# Patient Record
Sex: FEMALE | Race: WHITE | NOT HISPANIC OR LATINO
[De-identification: names, ages, dates, MRNs, and addresses within clinical notes are randomized per-mention and may not be internally consistent; named-entity substitution may affect disease eponyms.]

---

## 2018-03-11 ENCOUNTER — TRANSCRIPTION ENCOUNTER (OUTPATIENT)
Age: 33
End: 2018-03-11

## 2018-03-19 ENCOUNTER — APPOINTMENT (OUTPATIENT)
Dept: ORTHOPEDIC SURGERY | Facility: CLINIC | Age: 33
End: 2018-03-19
Payer: COMMERCIAL

## 2018-03-19 VITALS
DIASTOLIC BLOOD PRESSURE: 79 MMHG | WEIGHT: 135 LBS | BODY MASS INDEX: 23.92 KG/M2 | SYSTOLIC BLOOD PRESSURE: 132 MMHG | HEIGHT: 63 IN | HEART RATE: 74 BPM

## 2018-03-19 DIAGNOSIS — Z84.89 FAMILY HISTORY OF OTHER SPECIFIED CONDITIONS: ICD-10-CM

## 2018-03-19 DIAGNOSIS — Z78.9 OTHER SPECIFIED HEALTH STATUS: ICD-10-CM

## 2018-03-19 PROCEDURE — 99204 OFFICE O/P NEW MOD 45 MIN: CPT | Mod: 25

## 2018-03-19 PROCEDURE — 20551 NJX 1 TENDON ORIGIN/INSJ: CPT | Mod: LT

## 2018-03-19 PROCEDURE — 73080 X-RAY EXAM OF ELBOW: CPT | Mod: LT

## 2018-03-19 RX ORDER — DICLOFENAC SODIUM 16.05 MG/ML
1.5 SOLUTION TOPICAL
Qty: 1 | Refills: 0 | Status: ACTIVE | COMMUNITY
Start: 2018-03-19 | End: 1900-01-01

## 2018-07-20 ENCOUNTER — APPOINTMENT (OUTPATIENT)
Dept: ORTHOPEDIC SURGERY | Facility: CLINIC | Age: 33
End: 2018-07-20
Payer: COMMERCIAL

## 2018-07-20 PROCEDURE — 99214 OFFICE O/P EST MOD 30 MIN: CPT

## 2018-07-20 RX ORDER — DICLOFENAC SODIUM 20 MG/G
2 SOLUTION TOPICAL
Qty: 1 | Refills: 0 | Status: ACTIVE | COMMUNITY
Start: 2018-07-20 | End: 1900-01-01

## 2019-06-10 ENCOUNTER — APPOINTMENT (OUTPATIENT)
Dept: ORTHOPEDIC SURGERY | Facility: CLINIC | Age: 34
End: 2019-06-10
Payer: COMMERCIAL

## 2019-06-10 PROCEDURE — 99214 OFFICE O/P EST MOD 30 MIN: CPT

## 2019-06-10 NOTE — HISTORY OF PRESENT ILLNESS
[FreeTextEntry1] : he patient is a 33-year-old female who presents for evaluation of left elbow pain.Her symptoms began approximately 2 years ago when she was active in weightlifting since that time she has gotten progressively worse and now presents with pain and stiffness of the elbow. The pain is still in nature located circumferentially around the elbow without radiation. It is worse with activity and improved with rest, she denies paresthesias.

## 2019-06-10 NOTE — ASSESSMENT
[FreeTextEntry1] : the patient is a 33-year-old female with 2 years of progressive left elbow pain now presented with pain and stiffness. MRI is positive for an effusion, I recommend ultrasound-guided aspiration and lab analysis. She will followup to review the results of the aspiration/analysis. She will continue activity as tolerated.

## 2019-06-10 NOTE — PHYSICAL EXAM
[Normal LUE] : Left Upper Extremity: No scars, rashes, lesions, ulcers, skin intact [Normal Finger/nose] : finger to nose coordination [Normal] : no peripheral adenopathy appreciated [de-identified] : left elbow:\par Skin intact no swelling no erythema no ecchymosis\par Mild tenderness to palpation at the radiocapitellar joint, no tenderness at the medial or lateral epicondyles or olecranon\par Range of motion limited secondary to stiffness ° flexion extension, pronation supination intact\par range of motion of the shoulder wrist and digits intact without pain\par Sensation intact distally, capillary refill less than 2 seconds [de-identified] : lillyr [de-identified] : MRI of the left elbow is reviewed there is a significant joint effusion containing "fibrinous" material no fracture or significant soft tissue injury

## 2019-06-10 NOTE — CONSULT LETTER
[Consult Letter:] : I had the pleasure of evaluating your patient, [unfilled]. [Dear  ___] : Dear  [unfilled], [Please see my note below.] : Please see my note below. [Sincerely,] : Sincerely, [Consult Closing:] : Thank you very much for allowing me to participate in the care of this patient.  If you have any questions, please do not hesitate to contact me. [FreeTextEntry3] : Dr. Eryn Brewer\par Orthopaedic Surgery\par Hand & Upper Extremity.\par

## 2019-06-24 ENCOUNTER — OUTPATIENT (OUTPATIENT)
Dept: OUTPATIENT SERVICES | Facility: HOSPITAL | Age: 34
LOS: 1 days | End: 2019-06-24

## 2019-06-24 ENCOUNTER — APPOINTMENT (OUTPATIENT)
Dept: ULTRASOUND IMAGING | Facility: CLINIC | Age: 34
End: 2019-06-24
Payer: COMMERCIAL

## 2019-06-24 PROCEDURE — 20606 DRAIN/INJ JOINT/BURSA W/US: CPT | Mod: LT

## 2019-06-25 ENCOUNTER — TRANSCRIPTION ENCOUNTER (OUTPATIENT)
Age: 34
End: 2019-06-25

## 2019-06-25 ENCOUNTER — OUTPATIENT (OUTPATIENT)
Dept: OUTPATIENT SERVICES | Facility: HOSPITAL | Age: 34
LOS: 1 days | End: 2019-06-25
Payer: COMMERCIAL

## 2019-06-25 DIAGNOSIS — M25.422 EFFUSION, LEFT ELBOW: ICD-10-CM

## 2019-06-25 PROCEDURE — 87075 CULTR BACTERIA EXCEPT BLOOD: CPT

## 2019-06-25 PROCEDURE — 87070 CULTURE OTHR SPECIMN AEROBIC: CPT

## 2019-06-25 PROCEDURE — 87205 SMEAR GRAM STAIN: CPT

## 2019-07-01 ENCOUNTER — APPOINTMENT (OUTPATIENT)
Dept: ORTHOPEDIC SURGERY | Facility: CLINIC | Age: 34
End: 2019-07-01
Payer: COMMERCIAL

## 2019-07-01 VITALS
HEIGHT: 63 IN | SYSTOLIC BLOOD PRESSURE: 122 MMHG | HEART RATE: 77 BPM | DIASTOLIC BLOOD PRESSURE: 69 MMHG | BODY MASS INDEX: 23.92 KG/M2 | WEIGHT: 135 LBS

## 2019-07-01 PROCEDURE — 99213 OFFICE O/P EST LOW 20 MIN: CPT

## 2019-07-02 NOTE — REVIEW OF SYSTEMS
[Joint Pain] : joint pain [Joint Stiffness] : joint stiffness [Negative] : Allergic/Immunologic [FreeTextEntry9] : Left elbow pain and stiffness

## 2019-07-02 NOTE — ASSESSMENT
[FreeTextEntry1] : Patient with stiffness to the left elbow along with an effusion that was diagnosed on MRI. She had an aspiration of the left elbow effusion on 06/24 with slight improvement in her symptoms. At this time the patient is still having difficulty fully extending her elbow. I would recommend a rheumatology consult for the patient to further understand the etiology of her effusion. The patient will follow back up in the office p.r.n.

## 2019-07-02 NOTE — PHYSICAL EXAM
[Normal LUE] : Left Upper Extremity: No scars, rashes, lesions, ulcers, skin intact [Normal Finger/nose] : finger to nose coordination [Normal] : no peripheral adenopathy appreciated [de-identified] : left elbow:\par Skin intact no swelling no erythema no ecchymosis\par Mild tenderness to palpation at the radiocapitellar joint, no tenderness at the medial or lateral epicondyles or olecranon\par Range of motion limited secondary to stiffness ° flexion extension, pronation supination intact\par range of motion of the shoulder wrist and digits intact without pain\par Sensation intact distally, capillary refill less than 2 seconds [de-identified] : lillyr

## 2019-07-02 NOTE — HISTORY OF PRESENT ILLNESS
[FreeTextEntry1] : 6/10: The patient is a 33-year-old female who presents for evaluation of left elbow pain.Her symptoms began approximately 2 years ago when she was active in weightlifting since that time she has gotten progressively worse and now presents with pain and stiffness of the elbow. The pain is still in nature located circumferentially around the elbow without radiation. It is worse with activity and improved with rest, she denies paresthesias.\par \par 07/01/2019: Patient returns to the office today for follow up after US guided aspiration of her left elbow.  She states her symptoms have improved a small amount, but she is still concerned with pain in the elbow along the lateral aspect.  She has not regained her full extension of the arm.  She denies paresthesias.

## 2020-01-03 ENCOUNTER — APPOINTMENT (OUTPATIENT)
Dept: ORTHOPEDIC SURGERY | Facility: CLINIC | Age: 35
End: 2020-01-03
Payer: COMMERCIAL

## 2020-01-03 VITALS
HEIGHT: 63 IN | DIASTOLIC BLOOD PRESSURE: 82 MMHG | WEIGHT: 133 LBS | HEART RATE: 74 BPM | SYSTOLIC BLOOD PRESSURE: 124 MMHG | BODY MASS INDEX: 23.57 KG/M2

## 2020-01-03 PROCEDURE — 99214 OFFICE O/P EST MOD 30 MIN: CPT

## 2020-01-03 PROCEDURE — 73080 X-RAY EXAM OF ELBOW: CPT | Mod: LT

## 2020-06-17 ENCOUNTER — APPOINTMENT (OUTPATIENT)
Dept: ORTHOPEDIC SURGERY | Facility: CLINIC | Age: 35
End: 2020-06-17
Payer: COMMERCIAL

## 2020-06-17 VITALS
DIASTOLIC BLOOD PRESSURE: 79 MMHG | HEART RATE: 90 BPM | SYSTOLIC BLOOD PRESSURE: 118 MMHG | HEIGHT: 63 IN | WEIGHT: 130 LBS | TEMPERATURE: 98.6 F | BODY MASS INDEX: 23.04 KG/M2

## 2020-06-17 PROCEDURE — 99213 OFFICE O/P EST LOW 20 MIN: CPT

## 2020-06-25 ENCOUNTER — RESULT REVIEW (OUTPATIENT)
Age: 35
End: 2020-06-25

## 2020-06-25 ENCOUNTER — OUTPATIENT (OUTPATIENT)
Dept: OUTPATIENT SERVICES | Facility: HOSPITAL | Age: 35
LOS: 1 days | End: 2020-06-25

## 2020-06-25 ENCOUNTER — APPOINTMENT (OUTPATIENT)
Dept: CT IMAGING | Facility: CLINIC | Age: 35
End: 2020-06-25
Payer: COMMERCIAL

## 2020-06-25 DIAGNOSIS — M77.12 LATERAL EPICONDYLITIS, LEFT ELBOW: ICD-10-CM

## 2020-06-25 PROCEDURE — 73200 CT UPPER EXTREMITY W/O DYE: CPT | Mod: 26,LT

## 2020-06-25 PROCEDURE — 76376 3D RENDER W/INTRP POSTPROCES: CPT | Mod: 26

## 2020-06-29 ENCOUNTER — NON-APPOINTMENT (OUTPATIENT)
Age: 35
End: 2020-06-29

## 2020-06-30 ENCOUNTER — APPOINTMENT (OUTPATIENT)
Dept: ORTHOPEDIC SURGERY | Facility: CLINIC | Age: 35
End: 2020-06-30
Payer: COMMERCIAL

## 2020-06-30 PROCEDURE — 99214 OFFICE O/P EST MOD 30 MIN: CPT | Mod: 95

## 2020-06-30 RX ORDER — MELOXICAM 15 MG/1
15 TABLET ORAL
Qty: 21 | Refills: 1 | Status: ACTIVE | COMMUNITY
Start: 2020-06-30 | End: 1900-01-01

## 2020-06-30 NOTE — HISTORY OF PRESENT ILLNESS
[Home] : at home, [unfilled] , at the time of the visit. [Medical Office: ([facility])___] : at the medical office located in  [Verbal consent obtained from patient] : the patient, [unfilled] [de-identified] : A telehealth appointment was performed today with Dennise.  This is clinical reassessment as well as review of her CT scan.  Overall her range of motion of her elbow is stable.  She still has limited extension and flexion.  Her pain is localized to the medial portion of her elbow.  She does have some mild pain laterally.  Pain is at its worse at a 3 out of 10 but at its best is a 1 out of 10.  It is worse with activities and flexion and extension.

## 2020-06-30 NOTE — DISCUSSION/SUMMARY
[de-identified] : Dennise is a 34-year-old female with left elbow pain.  She has a significant stiffness.  She has been seen rheumatologist in the past.  At this time I would like her to send over the blood work-up for rheumatologic studies.  I want to better assess if there is any systemic versus inflammatory arthropathies versus elbow arthroscopy.  She understands the risks and complications associated with surgery.  I will call her once to review the test results.  She agrees with the above plan and all questions were answered.

## 2020-06-30 NOTE — REVIEW OF SYSTEMS
[Joint Pain] : joint pain [Joint Stiffness] : joint stiffness [FreeTextEntry9] : Left elbow [Negative] : Heme/Lymph

## 2020-06-30 NOTE — PHYSICAL EXAM
[de-identified] : CT scan performed at an outside facility available for me to review with patient today.  This demonstrates no loose body formation.  Mild joint effusion noted. [de-identified] : Physical Exam:\par General: Well appearing, no acute distress, A&O\par Neurologic: A&Ox3, No focal deficits\par Head: NCAT without abrasions, lacerations, or ecchymosis to head, face, or scalp\par Eyes: No scleral icterus, no gross abnormalities\par Respiratory: Equal chest wall expansion bilaterally, no accessory muscle use\par Lymphatic: No lymphadenopathy palpated\par Skin: Warm and dry\par Psychiatric: Normal mood and affect\par \par Left Elbow Exam\par \par ·	Skin: Clean, dry, intact. No ecchymosis. No swelling. No palpable joint effusion.\par ·	ROM: RIGHT  0-140, full supination/pronation.  LEFT -28 - 114, full supination/pronation.\par ·	Painful ROM: None\par ·	Tenderness: [No] medial epicondyle pain. [No] lateral epicondyle pain. [No] olecranon pain. No pain at radial head.\par ·	Strength: 5/5 elbow flexion, 5/5 elbow extension, 5/5 supination, 5/5 pronation subjectively per patient\par ·	Sensation: In tact to light touch throughout per patient\par ·	Neuro: Negative tinels at ulnar canal, AIN/PIN/Ulnar nerve in tact to motor/sensation.\par  Tenderness noted over the posterior lateral elbow when palpated by patient.

## 2020-08-03 ENCOUNTER — NON-APPOINTMENT (OUTPATIENT)
Age: 35
End: 2020-08-03

## 2020-08-26 ENCOUNTER — APPOINTMENT (OUTPATIENT)
Dept: ORTHOPEDIC SURGERY | Facility: CLINIC | Age: 35
End: 2020-08-26
Payer: COMMERCIAL

## 2020-08-26 PROCEDURE — 99214 OFFICE O/P EST MOD 30 MIN: CPT

## 2020-08-26 NOTE — DISCUSSION/SUMMARY
[de-identified] : Dennise is a 34-year-old female with left elbow pain.  She has a significant stiffness.  She has been seen rheumatologist in the past. She comes in with blood work-up for rheumatologic studies for our review that were performed in past so we can better assess whether this is a systemic versus inflammatory arthropathy. After review I believe her stiffness is joint specific and more of an inflammatory arthropathy. Based on her constant symptoms of stiffness and pain, despite conservative measures, pt elects for an elbow arthroscopy for exploration and debridement.  She understands the risks and complications associated with surgery. I had my surgical coordinator meet with the patient to go over surgical dates. SHe was given no assurance that all her symptoms will be alleviated. She agrees with the above plan and all questions were answered.

## 2020-08-26 NOTE — PHYSICAL EXAM
[de-identified] : Physical Exam:\par General: Well appearing, no acute distress, A&O\par Neurologic: A&Ox3, No focal deficits\par Head: NCAT without abrasions, lacerations, or ecchymosis to head, face, or scalp\par Eyes: No scleral icterus, no gross abnormalities\par Respiratory: Equal chest wall expansion bilaterally, no accessory muscle use\par Lymphatic: No lymphadenopathy palpated\par Skin: Warm and dry\par Psychiatric: Normal mood and affect\par \par Left Elbow Exam\par \par ·	Skin: Clean, dry, intact. No ecchymosis. No swelling. No palpable joint effusion.\par ·	ROM: RIGHT  0-140, full supination/pronation.  LEFT -5 - 130, full supination/pronation.\par ·	Painful ROM: None\par ·	Tenderness: [No] medial epicondyle pain. [No] lateral epicondyle pain. [No] olecranon pain. No pain at radial head.\par ·	Strength: 5/5 elbow flexion, 5/5 elbow extension, 5/5 supination, 5/5 pronation\par ·	Stability: Stable to vaus/valgus stress\par ·	Vasc: 2+ radial pulse, <2s cap refill\par ·	Sensation: In tact to light touch throughout\par ·	Neuro: Negative tinels at ulnar canal, AIN/PIN/Ulnar nerve in tact to motor/sensation.\par Negative milking sign.  No valgus or varus instability noted.  Tenderness noted over the posterior lateral elbow.  No signs of posterior lateral instability noted.  No instability with a tricep dip.  Hard block with extension as well as flexion.

## 2020-08-26 NOTE — REVIEW OF SYSTEMS
[Joint Pain] : joint pain [Joint Stiffness] : joint stiffness [Joint Swelling] : joint swelling [Negative] : Heme/Lymph [FreeTextEntry9] : left elbow

## 2020-08-26 NOTE — DISCUSSION/SUMMARY
[de-identified] : Dennise is a 34-year-old female with left elbow pain.  She has a significant stiffness.  She has been seen rheumatologist in the past. She comes in with blood work-up for rheumatologic studies for our review that were performed in past so we can better assess whether this is a systemic versus inflammatory arthropathy. After review I believe her stiffness is joint specific and more of an inflammatory arthropathy. Based on her constant symptoms of stiffness and pain, despite conservative measures, pt elects for an elbow arthroscopy for exploration and debridement.  She understands the risks and complications associated with surgery. I had my surgical coordinator meet with the patient to go over surgical dates. SHe was given no assurance that all her symptoms will be alleviated. She agrees with the above plan and all questions were answered.

## 2020-08-26 NOTE — PHYSICAL EXAM
[de-identified] : Physical Exam:\par General: Well appearing, no acute distress, A&O\par Neurologic: A&Ox3, No focal deficits\par Head: NCAT without abrasions, lacerations, or ecchymosis to head, face, or scalp\par Eyes: No scleral icterus, no gross abnormalities\par Respiratory: Equal chest wall expansion bilaterally, no accessory muscle use\par Lymphatic: No lymphadenopathy palpated\par Skin: Warm and dry\par Psychiatric: Normal mood and affect\par \par Left Elbow Exam\par \par ·	Skin: Clean, dry, intact. No ecchymosis. No swelling. No palpable joint effusion.\par ·	ROM: RIGHT  0-140, full supination/pronation.  LEFT -5 - 130, full supination/pronation.\par ·	Painful ROM: None\par ·	Tenderness: [No] medial epicondyle pain. [No] lateral epicondyle pain. [No] olecranon pain. No pain at radial head.\par ·	Strength: 5/5 elbow flexion, 5/5 elbow extension, 5/5 supination, 5/5 pronation\par ·	Stability: Stable to vaus/valgus stress\par ·	Vasc: 2+ radial pulse, <2s cap refill\par ·	Sensation: In tact to light touch throughout\par ·	Neuro: Negative tinels at ulnar canal, AIN/PIN/Ulnar nerve in tact to motor/sensation.\par Negative milking sign.  No valgus or varus instability noted.  Tenderness noted over the posterior lateral elbow.  No signs of posterior lateral instability noted.  No instability with a tricep dip.  Hard block with extension as well as flexion.

## 2020-10-02 ENCOUNTER — RESULT REVIEW (OUTPATIENT)
Age: 35
End: 2020-10-02

## 2020-10-02 ENCOUNTER — APPOINTMENT (OUTPATIENT)
Dept: ORTHOPEDIC SURGERY | Facility: AMBULATORY SURGERY CENTER | Age: 35
End: 2020-10-02
Payer: COMMERCIAL

## 2020-10-02 PROCEDURE — 29838 ELBOW ARTHROSCOPY/SURGERY: CPT | Mod: LT

## 2020-10-02 PROCEDURE — 29834 ELBOW ARTHROSCOPY/SURGERY: CPT | Mod: LT

## 2020-10-02 PROCEDURE — 29836 ELBOW ARTHROSCOPY/SURGERY: CPT | Mod: LT

## 2020-10-02 RX ORDER — OXYCODONE 5 MG/1
5 TABLET ORAL
Qty: 30 | Refills: 0 | Status: COMPLETED | COMMUNITY
Start: 2020-10-02 | End: 2020-10-09

## 2020-10-02 RX ORDER — ONDANSETRON 4 MG/1
4 TABLET ORAL
Qty: 30 | Refills: 0 | Status: COMPLETED | COMMUNITY
Start: 2020-10-02 | End: 2020-10-07

## 2020-10-02 RX ORDER — ACETAMINOPHEN 500 MG/1
500 TABLET ORAL
Qty: 120 | Refills: 0 | Status: COMPLETED | COMMUNITY
Start: 2020-10-02 | End: 2020-10-22

## 2020-10-02 RX ORDER — NAPROXEN 500 MG/1
500 TABLET ORAL
Qty: 28 | Refills: 0 | Status: COMPLETED | COMMUNITY
Start: 2020-10-02 | End: 2020-10-16

## 2020-10-13 ENCOUNTER — APPOINTMENT (OUTPATIENT)
Dept: ORTHOPEDIC SURGERY | Facility: CLINIC | Age: 35
End: 2020-10-13
Payer: COMMERCIAL

## 2020-10-13 PROCEDURE — 99024 POSTOP FOLLOW-UP VISIT: CPT

## 2020-10-13 PROCEDURE — 73080 X-RAY EXAM OF ELBOW: CPT | Mod: LT

## 2020-10-14 NOTE — HISTORY OF PRESENT ILLNESS
[___ Days Post Op] : post op day #[unfilled] [Clean/Dry/Intact] : clean, dry and intact [Neuro Intact] : an unremarkable neurological exam [Vascular Intact] : ~T peripheral vascular exam normal [Xray (Date:___)] : [unfilled] Xray -  [Chills] : no chills [Fever] : no fever [Nausea] : no nausea [Vomiting] : no vomiting [Erythema] : not erythematous [Swelling] : not swollen [Discharge] : absent of discharge [de-identified] : S/P left elbow arthroscopy, extensive debridement, synovectomy, removal of loose bodies. DOS: 10/2/2020. [Dehiscence] : not dehisced [de-identified] : JEN is a 35 year old female who presents 11 days S/P left elbow arthroscopy, extensive debridement, synovectomy, removal of loose bodies. The patient denies numbness/tingling to the extremity.  She denies use of pain medication since 2 days post-surgery.  She admits to stiffness in the mornings.  She has been to PT and performs HEP.   [de-identified] : 3 views of the left elbow were performed today and are available for me to review.  They were discussed with the patient and demonstrate no fracture, no dislocation, no deformities.\par \par  [de-identified] : Incisions are clean, dry and intact. No surrounding erythema. No drainage. Wound appears to be healing well. She has passive range of motion of 110° flexion to 30° of extension. Limited supination secondary to stiffness. Full pronation. Motor and sensation are intact distally. She has full range of motion of all fingers. She has good nerve function and median nerve, ulnar, radial, PIN, AIN. She is able to make an A-OK sign. She has no sensory deficits over the lateral antebrachial cutaneous nerve. Radial pulses 2+.\par \par Full ROM to wrist and hand [de-identified] : JEN is a 35 year old female who presents 11 days S/P left elbow arthroscopy, extensive debridement, synovectomy, removal of loose bodies. The patient denies numbness/tingling to the extremity.  She denies use of pain medication since 2 days post-surgery.  She admits to stiffness in the mornings.  She has been to PT and performs HEP.  \par \par She will continue PT 2x/week until seeing us again in 4 weeks. She agrees with the above plan and all questions were answered.\par

## 2020-11-16 ENCOUNTER — APPOINTMENT (OUTPATIENT)
Dept: ORTHOPEDIC SURGERY | Facility: CLINIC | Age: 35
End: 2020-11-16
Payer: COMMERCIAL

## 2020-11-16 PROCEDURE — 99024 POSTOP FOLLOW-UP VISIT: CPT

## 2020-11-16 NOTE — HISTORY OF PRESENT ILLNESS
[Clean/Dry/Intact] : clean, dry and intact [Neuro Intact] : an unremarkable neurological exam [Vascular Intact] : ~T peripheral vascular exam normal [Chills] : no chills [Fever] : no fever [Nausea] : no nausea [Vomiting] : no vomiting [Erythema] : not erythematous [Discharge] : absent of discharge [Swelling] : not swollen [Dehiscence] : not dehisced [de-identified] : S/P left elbow arthroscopy, extensive debridement, synovectomy, removal of loose bodies. DOS: 10/2/2020. [de-identified] : JEN is a 35 year old female who presents 6 weeks S/P left elbow arthroscopy, extensive debridement, synovectomy, removal of loose bodies. The patient denies numbness/tingling to the extremity.  She denies use of pain medication since 2 days post-surgery.  She admits to discomfort in her forearm with typing for work.  She performs PT 2x/week and performs HEP.   [de-identified] : Incisions are clean, dry and intact. No surrounding erythema. No drainage. Wound appears to be healing well. She has passive range of motion of 120° flexion to 10° of extension. Limited supination secondary to stiffness. Full pronation. Motor and sensation are intact distally. She has full range of motion of all fingers. She has good nerve function and median nerve, ulnar, radial, PIN, AIN. She is able to make an A-OK sign. She has no sensory deficits over the lateral antebrachial cutaneous nerve. Radial pulses 2+.\par \par Full ROM to wrist and hand [de-identified] : No new imaging. [de-identified] : JEN is a 35 year old female who presents 6 weeks S/P left elbow arthroscopy, extensive debridement, synovectomy, removal of loose bodies. The patient denies numbness/tingling to the extremity.  She denies use of pain medication since 2 days post-surgery.  She admits to stiffness in the mornings.  She has been to PT and performs HEP.  \par \par I would like her to start with static progressive splinting that will be supplied by our orthotist. She will continue PT 2x/week until seeing us again in 6 weeks. She agrees with the above plan and all questions were answered.

## 2021-01-19 ENCOUNTER — APPOINTMENT (OUTPATIENT)
Dept: ORTHOPEDIC SURGERY | Facility: CLINIC | Age: 36
End: 2021-01-19
Payer: COMMERCIAL

## 2021-01-19 PROCEDURE — 99214 OFFICE O/P EST MOD 30 MIN: CPT

## 2021-01-19 PROCEDURE — 99072 ADDL SUPL MATRL&STAF TM PHE: CPT

## 2021-01-19 NOTE — REVIEW OF SYSTEMS
[Joint Stiffness] : joint stiffness [Joint Pain] : no joint pain [FreeTextEntry9] : left elbow aftercare following sx.

## 2021-01-19 NOTE — HISTORY OF PRESENT ILLNESS
[___ mths] : [unfilled] month(s) ago [Improving] : improving [0] : a maximum pain level of 0/10 [Physical Therapy] : relieved by physical therapy [de-identified] : JEN is a 35 year old female who presents 3.5 months S/P left elbow arthroscopy, extensive debridement, synovectomy, removal of loose bodies. The patient denies numbness/tingling to the extremity. She denies use of pain medication since 2 days post-surgery. She performs PT 2x/week and performs HEP. Her therapist recently included new exercises and progressions.  She continues to lack terminal extension and flexion, however she denies pain.  She states it does not feel like her contralateral elbow, but she denies pain.  Current symptoms include no chills, no fever, no nausea and no vomiting.

## 2021-01-19 NOTE — PHYSICAL EXAM
[de-identified] : Left Elbow\par \par Incisions are clean, dry and intact. No surrounding erythema. No drainage. Wound appears to be healing well. She has passive range of motion of 13 so I found a pocket and then active reimbursed 0° flexion to +5° of extension. Limited supination secondary to stiffness. Full pronation. Motor and sensation are intact distally. She has full range of motion of all fingers. She has good nerve function and median nerve, ulnar, radial, PIN, AIN. She is able to make an A-OK sign. She has no sensory deficits over the lateral antebrachial cutaneous nerve. Radial pulses 2+.\par \par Full ROM to wrist and hand.

## 2021-01-19 NOTE — DISCUSSION/SUMMARY
[de-identified] : EJN is a 35 year old female who presents 3.5 months S/P left elbow arthroscopy, extensive debridement, synovectomy, removal of loose bodies. The patient denies numbness/tingling to the extremity.  She denies use of pain medication since 2 days post-surgery.  She admits to stiffness in the mornings.  She has been to PT and performs HEP.   Since last seeing us she has started static progressive splinting, +4 to 132 degrees on exam today, that has offered her increase ROM but causes soreness. Due to this the patient will d/c use of this at this time and continue her HEP. She agrees with the above plan and all questions were answered.

## 2021-01-19 NOTE — REASON FOR VISIT
[Follow-Up Visit] : a follow-up visit for [FreeTextEntry2] : S/P left elbow arthroscopy, extensive debridement, synovectomy, removal of loose bodies. DOS: 10/2/2020. \par

## 2021-04-19 ENCOUNTER — APPOINTMENT (OUTPATIENT)
Dept: ORTHOPEDIC SURGERY | Facility: CLINIC | Age: 36
End: 2021-04-19
Payer: COMMERCIAL

## 2021-04-19 VITALS
WEIGHT: 130 LBS | HEART RATE: 81 BPM | HEIGHT: 63 IN | SYSTOLIC BLOOD PRESSURE: 126 MMHG | BODY MASS INDEX: 23.04 KG/M2 | DIASTOLIC BLOOD PRESSURE: 75 MMHG | OXYGEN SATURATION: 100 %

## 2021-04-19 PROCEDURE — 99072 ADDL SUPL MATRL&STAF TM PHE: CPT

## 2021-04-19 PROCEDURE — 99214 OFFICE O/P EST MOD 30 MIN: CPT

## 2021-04-19 NOTE — PHYSICAL EXAM
[de-identified] : Physical Exam:\par General: Well appearing, no acute distress\par Neurologic: A&Ox3, No focal deficits\par Head: NCAT without abrasions, lacerations, or ecchymosis to head, face, or scalp\par Eyes: No scleral icterus, no gross abnormalities\par Respiratory: Equal chest wall expansion bilaterally, no accessory muscle use\par Lymphatic: No lymphadenopathy palpated\par Skin: Warm and dry\par Psychiatric: Normal mood and affect\par \par Left Elbow\par \par Incisions are clean, dry and intact. No surrounding erythema. No drainage. Wound appears to be healing well. She has passive range of motion of 130 so I found a pocket and then active reimbursed 140° flexion to +3° of extension. Limited supination secondary to stiffness. Full pronation. Motor and sensation are intact distally. She has full range of motion of all fingers. She has good nerve function and median nerve, ulnar, radial, PIN, AIN. She is able to make an A-OK sign. She has no sensory deficits over the lateral antebrachial cutaneous nerve. Radial pulses 2+.\par \par Full ROM to wrist and hand.

## 2021-04-19 NOTE — DISCUSSION/SUMMARY
[de-identified] : JEN is a 35 year old female who presents 6 months S/P left elbow arthroscopy, extensive debridement, synovectomy, removal of loose bodies. The patient denies numbness/tingling to the extremity. She denies use of pain medication since 2 days post-surgery. She performs PT 2x/week and performs HEP. Currently, she reports she is doing well and has no pain.\par \par We had a thorough discussion regarding the nature of her pain, the pathophysiology, as well as all treatment options. She is doing well postop. We discussed transitioning to a home exercise program to work on elbow ROM. All questions were answered and the patient verbalized understanding. The patient is in agreement with this treatment plan. Patient will follow up in 3 months for repeat clinical assessment.

## 2021-04-19 NOTE — HISTORY OF PRESENT ILLNESS
[Improving] : improving [___ mths] : [unfilled] month(s) ago [0] : a minimum pain level of 0/10 [Physical Therapy] : relieved by physical therapy [de-identified] : JEN is a 35 year old female who presents 6 months S/P left elbow arthroscopy, extensive debridement, synovectomy, removal of loose bodies. The patient denies numbness/tingling to the extremity. She denies use of pain medication since 2 days post-surgery. She performs PT 2x/week and performs HEP. Currently, she reports she is doing well and has no pain.

## 2021-04-19 NOTE — REVIEW OF SYSTEMS
[Joint Stiffness] : joint stiffness [Negative] : Heme/Lymph [Joint Pain] : no joint pain [FreeTextEntry9] : left elbow aftercare following sx.

## 2021-04-19 NOTE — REASON FOR VISIT
[Follow-Up Visit] : a follow-up visit for [Aftercare Following Surgery] : aftercare following surgery [FreeTextEntry2] : S/P left elbow arthroscopy, extensive debridement, synovectomy, removal of loose bodies. DOS: 10/2/2020. \par

## 2021-04-19 NOTE — ADDENDUM
[FreeTextEntry1] : Documented by Ernesto Hills acting as a scribe for Dr. Lau on 04/19/2021. \par \par All medical record entries made by the Scribe were at my, Dr. Lau's, direction and\par personally dictated by me on 04/19/2021. I have reviewed the chart and agree that the record\par accurately reflects my personal performance of the history, physical exam, procedure and imaging.

## 2021-07-10 ENCOUNTER — NON-APPOINTMENT (OUTPATIENT)
Age: 36
End: 2021-07-10

## 2021-07-12 ENCOUNTER — APPOINTMENT (OUTPATIENT)
Dept: ORTHOPEDIC SURGERY | Facility: CLINIC | Age: 36
End: 2021-07-12
Payer: COMMERCIAL

## 2021-07-12 VITALS
HEIGHT: 63 IN | WEIGHT: 130 LBS | SYSTOLIC BLOOD PRESSURE: 114 MMHG | HEART RATE: 70 BPM | BODY MASS INDEX: 23.04 KG/M2 | DIASTOLIC BLOOD PRESSURE: 82 MMHG

## 2021-07-12 DIAGNOSIS — M25.422 EFFUSION, LEFT ELBOW: ICD-10-CM

## 2021-07-12 DIAGNOSIS — M77.12 LATERAL EPICONDYLITIS, LEFT ELBOW: ICD-10-CM

## 2021-07-12 PROCEDURE — 99214 OFFICE O/P EST MOD 30 MIN: CPT

## 2021-07-12 PROCEDURE — 99072 ADDL SUPL MATRL&STAF TM PHE: CPT

## 2021-07-12 NOTE — DISCUSSION/SUMMARY
[de-identified] : JEN is a 35 year old female who presents 9 months S/P left elbow arthroscopy, extensive debridement, synovectomy, removal of loose bodies, 9 months ago. The patient denies numbness/tingling to the extremity. She reports no pain at this time. No complaints.\par \par At this point, patient ROM has greatly improved, and her pain has subsided, she is doing well and happy with her progress so far. She will continue HEP along with exercises. There is mild stiffness on physical exam of which conservative treatment is indicated. Conservative measures of treatment include rest until asymptomatic, activity avoidance, NSAID's PRN, application to ice to the area 2-3x daily for 20 minutes, with gradual return to activities. Patient will follow up in 3 months for repeat clinical assessment. All questions were answered and the patient verbalized understanding. The patient is in agreement with this treatment plan.

## 2021-07-12 NOTE — PHYSICAL EXAM
[de-identified] : Physical Exam:\par General: Well appearing, no acute distress\par Neurologic: A&Ox3, No focal deficits\par Head: NCAT without abrasions, lacerations, or ecchymosis to head, face, or scalp\par Eyes: No scleral icterus, no gross abnormalities\par Respiratory: Equal chest wall expansion bilaterally, no accessory muscle use\par Lymphatic: No lymphadenopathy palpated\par Skin: Warm and dry\par Psychiatric: Normal mood and affect\par \par Left Elbow\par \par Incisions are clean, dry and intact. No surrounding erythema. No drainage. Wound appears to be healing well. She has passive range of motion of 130 active 140° flexion to +3° of extension. Limited supination secondary to stiffness. Full pronation. Motor and sensation are intact distally. She has full range of motion of all fingers. She has good nerve function and median nerve, ulnar, radial, PIN, AIN. She is able to make an A-OK sign. She has no sensory deficits over the lateral antebrachial cutaneous nerve. Radial pulses 2+.\par \par Full ROM to wrist and hand.

## 2021-07-12 NOTE — HISTORY OF PRESENT ILLNESS
[Improving] : improving [___ mths] : [unfilled] month(s) ago [0] : a maximum pain level of 0/10 [Physical Therapy] : relieved by physical therapy [de-identified] : JEN is a 35 year old female who presents 9 months S/P left elbow arthroscopy, extensive debridement, synovectomy, removal of loose bodies, 9 months ago. The patient denies numbness/tingling to the extremity. She reports no pain at this time. No complaints.

## 2021-07-12 NOTE — ADDENDUM
[FreeTextEntry1] : Documented by Andi Quiñonez acting as a scribe for Dr. Lau on 07/12/2021. \par \par All medical record entries made by the Scribe were at my, Dr. Lau's, direction and\par personally dictated by me on 07/12/2021. I have reviewed the chart and agree that the record\par accurately reflects my personal performance of the history, physical exam, procedure and imaging.

## 2021-07-12 NOTE — REASON FOR VISIT
[Follow-Up Visit] : a follow-up visit for [FreeTextEntry2] : S/P left elbow arthroscopy, extensive debridement, synovectomy, removal of loose bodies. DOS: 10/2/2020.

## 2021-10-04 ENCOUNTER — APPOINTMENT (OUTPATIENT)
Dept: ORTHOPEDIC SURGERY | Facility: CLINIC | Age: 36
End: 2021-10-04
Payer: COMMERCIAL

## 2021-10-04 VITALS
BODY MASS INDEX: 23.04 KG/M2 | HEIGHT: 63 IN | DIASTOLIC BLOOD PRESSURE: 78 MMHG | SYSTOLIC BLOOD PRESSURE: 122 MMHG | HEART RATE: 76 BPM | WEIGHT: 130 LBS

## 2021-10-04 DIAGNOSIS — M76.32 ILIOTIBIAL BAND SYNDROME, RIGHT LEG: ICD-10-CM

## 2021-10-04 DIAGNOSIS — M76.52 PATELLAR TENDINITIS, LEFT KNEE: ICD-10-CM

## 2021-10-04 DIAGNOSIS — Z98.890 OTHER SPECIFIED POSTPROCEDURAL STATES: ICD-10-CM

## 2021-10-04 DIAGNOSIS — S53.442A ULNAR COLLATERAL LIGAMENT SPRAIN OF LEFT ELBOW, INITIAL ENCOUNTER: ICD-10-CM

## 2021-10-04 DIAGNOSIS — M22.2X1 PATELLOFEMORAL DISORDERS, RIGHT KNEE: ICD-10-CM

## 2021-10-04 DIAGNOSIS — M25.622 STIFFNESS OF LEFT ELBOW, NOT ELSEWHERE CLASSIFIED: ICD-10-CM

## 2021-10-04 DIAGNOSIS — M76.31 ILIOTIBIAL BAND SYNDROME, RIGHT LEG: ICD-10-CM

## 2021-10-04 PROCEDURE — 99214 OFFICE O/P EST MOD 30 MIN: CPT

## 2021-10-04 PROCEDURE — 73564 X-RAY EXAM KNEE 4 OR MORE: CPT | Mod: RT

## 2021-10-04 RX ORDER — MELOXICAM 7.5 MG/1
7.5 TABLET ORAL
Qty: 21 | Refills: 0 | Status: ACTIVE | COMMUNITY
Start: 2021-10-04 | End: 1900-01-01

## 2021-10-04 NOTE — HISTORY OF PRESENT ILLNESS
[Improving] : improving [___ yrs] : [unfilled] year(s) ago [0] : a maximum pain level of 0/10 [Physical Therapy] : relieved by physical therapy [de-identified] : JEN is a 35 year old female who presents 1 year S/P left elbow arthroscopy, extensive debridement, synovectomy, removal of loose bodies. The patient denies numbness/tingling to the extremity. She reports no pain at this time. She presents for f/u evaluation, new R knee pain. She reports her knee has been bothering her for approx. 1 year. She reports most pain with squatting and after exercise. She endorses pain over lateral aspect of R knee. She admits to experiencing pain when she ambulates without sneakers.

## 2021-10-04 NOTE — PHYSICAL EXAM
[de-identified] : Physical Exam:\par General: Well appearing, no acute distress\par Neurologic: A&Ox3, No focal deficits\par Head: NCAT without abrasions, lacerations, or ecchymosis to head, face, or scalp\par Eyes: No scleral icterus, no gross abnormalities\par Respiratory: Equal chest wall expansion bilaterally, no accessory muscle use\par Lymphatic: No lymphadenopathy palpated\par Skin: Warm and dry\par Psychiatric: Normal mood and affect\par \par Left Elbow\par \par Incisions are clean, dry and intact. No surrounding erythema. No drainage. Wound appears to be healing well. She has passive range of motion of 130 active 140° flexion to +3° of extension. Limited supination secondary to stiffness. Full pronation. Motor and sensation are intact distally. She has full range of motion of all fingers. She has good nerve function and median nerve, ulnar, radial, PIN, AIN. She is able to make an A-OK sign. She has no sensory deficits over the lateral antebrachial cutaneous nerve. Radial pulses 2+.\par \par Full ROM to wrist and hand.\par \par Right Lower Extremity \par o Foot:\par ¦ Inspection/Palpation : no tenderness to palpation, no swelling. Pes cavus bilaterally. \par ¦ Range of Motion : arc of motion full and painless in all planes\par ¦ Stability : no joint instability on provocative testing\par ¦ Strength : all muscles 5/5\par o Skin : no erythema or ecchymosis present \par o Sensation : sensation to light touch intact\par o Vascular Exam : no edema, no cyanosis, dorsalis pedis artery pulse 2+, posterior tibial artery pulse 2+ \par \par Left Lower Extremity \par o Foot:\par ¦ Inspection/Palpation : no tenderness to palpation, no swelling\par ¦ Range of Motion : arc of motion full and painless in all planes\par ¦ Stability : no joint instability on provocative testing\par ¦ Strength : all muscles 5/5\par o Skin : no erythema or ecchymosis present\par o Sensation : sensation to light touch intact\par o Vascular Exam : no edema, no cyanosis, dorsalis pedis artery pulse 2+, posterior tibial artery pulse 2+ \par \par \par Right knee\par \par Inspection/Palpation: TTP over the IT band and femoral condyle. Gait evaluation does not reveal a limp. There is no inguinal adenopathy. Limb is well-perfused, without skin lesions, shows a grossly normal motor and sensory examination. No effusion.\par ROM: Normal\par Muscle/Nerves: Strength 5/5. Motor exam 5/distally, EHL/FHL/GSC/TA\par SILT L4-S1\par \par Special Tests: \par \par No Joint line tenderness noted  at medial and lateral joint line at 0 and 90 degrees. \par Stable in varus and valgus stress at 0 and 30 degrees\par Negative posterior drawer. \par Negative anterior drawer and stable Lachman \par Normal hip and ankle exam.  [de-identified] : The following radiographs were ordered and read by me during this patients visit. I reviewed each radiograph in detail with the patient and discussed the findings as highlighted below. \par \par 4 views of the Right knee show lateral titling of patella, otherwise, no fracture, dislocation or bony lesions. There is no evidence of degenerative change in the medial, lateral and patellofemoral compartments with maintenance of the joint space. Otherwise unremarkable.

## 2021-10-04 NOTE — ADDENDUM
[FreeTextEntry1] : Documented by Andi Quiñonez acting as a scribe for Dr. Lau on 10/04/2021. \par \par All medical record entries made by the Scribe were at my, Dr. Lau's, direction and\par personally dictated by me on 10/04/2021. I have reviewed the chart and agree that the record\par accurately reflects my personal performance of the history, physical exam, procedure and imaging.

## 2021-10-04 NOTE — REVIEW OF SYSTEMS
[Negative] : Heme/Lymph [Joint Pain] : joint pain [FreeTextEntry9] : left elbow aftercare following sx; Right knee

## 2021-10-04 NOTE — DISCUSSION/SUMMARY
[de-identified] : JEN is a 35 year old female who presents 1 year S/P left elbow arthroscopy, extensive debridement, synovectomy, removal of loose bodies. The patient denies numbness/tingling to the extremity. She reports no pain at this time. She presents for f/u evaluation, new R knee pain. She reports her knee has been bothering her for approx. 1 year. She reports most pain with squatting and after exercise. She endorses pain over lateral aspect of R knee. She admits to experiencing pain when she ambulates without sneakers. \par \par In regards to her elbow, At this point, patient ROM has greatly improved, and her pain has subsided, she is doing well and happy with her progress so far. In regards to her Right knee, After review of pts normal knee radiographs, based off of his symptoms and clinical exam I believe his primary complaint at this time to be IT band tendonitis. I discussed the treatment of IT band tendinitis with the patient including nonoperative management as first line treatment. Anti-inflammatory medications (NSAID's) with physical therapy for strengthening and conditioning.\par \par The patient elected to proceed with nonoperative management including PT 2x/week until we see them again alongside Mobic x 21 days with food; if GI upset occurs D/C and call us. Given patient has pes cavus, I advised patient to obtain OTC orthotics and wear as tolerated. We will see them again for clinical reassessment in 2-3 months. Pt agrees to the above plan and all questions were answered.

## 2022-01-10 ENCOUNTER — APPOINTMENT (OUTPATIENT)
Dept: ORTHOPEDIC SURGERY | Facility: CLINIC | Age: 37
End: 2022-01-10

## 2024-05-30 ENCOUNTER — APPOINTMENT (OUTPATIENT)
Dept: DERMATOLOGY | Facility: CLINIC | Age: 39
End: 2024-05-30
Payer: COMMERCIAL

## 2024-05-30 PROCEDURE — 99204 OFFICE O/P NEW MOD 45 MIN: CPT

## 2024-06-13 ENCOUNTER — APPOINTMENT (OUTPATIENT)
Dept: DERMATOLOGY | Facility: CLINIC | Age: 39
End: 2024-06-13

## 2024-07-03 ENCOUNTER — APPOINTMENT (OUTPATIENT)
Dept: ORTHOPEDIC SURGERY | Facility: CLINIC | Age: 39
End: 2024-07-03

## 2024-07-23 ENCOUNTER — APPOINTMENT (OUTPATIENT)
Dept: DERMATOLOGY | Facility: CLINIC | Age: 39
End: 2024-07-23
Payer: COMMERCIAL

## 2024-07-23 PROCEDURE — 99213 OFFICE O/P EST LOW 20 MIN: CPT

## 2024-10-17 ENCOUNTER — APPOINTMENT (OUTPATIENT)
Dept: FAMILY MEDICINE | Facility: CLINIC | Age: 39
End: 2024-10-17

## 2024-10-17 VITALS
SYSTOLIC BLOOD PRESSURE: 110 MMHG | HEIGHT: 63 IN | DIASTOLIC BLOOD PRESSURE: 70 MMHG | BODY MASS INDEX: 26.05 KG/M2 | OXYGEN SATURATION: 98 % | RESPIRATION RATE: 12 BRPM | HEART RATE: 87 BPM | WEIGHT: 147 LBS

## 2024-10-17 DIAGNOSIS — Z00.00 ENCOUNTER FOR GENERAL ADULT MEDICAL EXAMINATION W/OUT ABNORMAL FINDINGS: ICD-10-CM

## 2024-10-17 DIAGNOSIS — Z23 ENCOUNTER FOR IMMUNIZATION: ICD-10-CM

## 2024-10-17 DIAGNOSIS — Z80.8 FAMILY HISTORY OF MALIGNANT NEOPLASM OF OTHER ORGANS OR SYSTEMS: ICD-10-CM

## 2024-10-17 DIAGNOSIS — Z80.0 FAMILY HISTORY OF MALIGNANT NEOPLASM OF DIGESTIVE ORGANS: ICD-10-CM

## 2024-10-17 PROCEDURE — 99385 PREV VISIT NEW AGE 18-39: CPT | Mod: 25

## 2024-10-17 PROCEDURE — 36415 COLL VENOUS BLD VENIPUNCTURE: CPT

## 2024-10-17 PROCEDURE — G0008: CPT

## 2024-10-17 PROCEDURE — 90656 IIV3 VACC NO PRSV 0.5 ML IM: CPT

## 2024-10-17 RX ORDER — BUPROPION HYDROCHLORIDE 300 MG/1
300 TABLET, EXTENDED RELEASE ORAL
Refills: 0 | Status: ACTIVE | COMMUNITY
Start: 2024-10-17

## 2024-10-21 LAB
ALBUMIN SERPL ELPH-MCNC: 4.4 G/DL
ALP BLD-CCNC: 65 U/L
ALT SERPL-CCNC: 14 U/L
ANION GAP SERPL CALC-SCNC: 15 MMOL/L
APPEARANCE: CLEAR
AST SERPL-CCNC: 19 U/L
BASOPHILS # BLD AUTO: 0.06 K/UL
BASOPHILS NFR BLD AUTO: 0.9 %
BILIRUB SERPL-MCNC: 0.2 MG/DL
BILIRUBIN URINE: NEGATIVE
BLOOD URINE: NEGATIVE
BUN SERPL-MCNC: 11 MG/DL
CALCIUM SERPL-MCNC: 9.2 MG/DL
CHLORIDE SERPL-SCNC: 102 MMOL/L
CHOLEST SERPL-MCNC: 162 MG/DL
CO2 SERPL-SCNC: 23 MMOL/L
COLOR: YELLOW
CREAT SERPL-MCNC: 0.58 MG/DL
EGFR: 119 ML/MIN/1.73M2
EOSINOPHIL # BLD AUTO: 0.2 K/UL
EOSINOPHIL NFR BLD AUTO: 2.8 %
GLUCOSE QUALITATIVE U: NEGATIVE MG/DL
GLUCOSE SERPL-MCNC: 82 MG/DL
HCT VFR BLD CALC: 43.7 %
HDLC SERPL-MCNC: 66 MG/DL
HGB BLD-MCNC: 13.9 G/DL
IMM GRANULOCYTES NFR BLD AUTO: 0.7 %
KETONES URINE: NEGATIVE MG/DL
LDLC SERPL CALC-MCNC: 81 MG/DL
LEUKOCYTE ESTERASE URINE: NEGATIVE
LYMPHOCYTES # BLD AUTO: 2.74 K/UL
LYMPHOCYTES NFR BLD AUTO: 38.9 %
MAN DIFF?: NORMAL
MCHC RBC-ENTMCNC: 29.4 PG
MCHC RBC-ENTMCNC: 31.8 GM/DL
MCV RBC AUTO: 92.6 FL
MONOCYTES # BLD AUTO: 0.55 K/UL
MONOCYTES NFR BLD AUTO: 7.8 %
NEUTROPHILS # BLD AUTO: 3.44 K/UL
NEUTROPHILS NFR BLD AUTO: 48.9 %
NITRITE URINE: NEGATIVE
NONHDLC SERPL-MCNC: 95 MG/DL
PH URINE: 7.5
PLATELET # BLD AUTO: 317 K/UL
POTASSIUM SERPL-SCNC: 4.2 MMOL/L
PROT SERPL-MCNC: 6.5 G/DL
PROTEIN URINE: NEGATIVE MG/DL
RBC # BLD: 4.72 M/UL
RBC # FLD: 12.9 %
SODIUM SERPL-SCNC: 140 MMOL/L
SPECIFIC GRAVITY URINE: 1.01
TRIGL SERPL-MCNC: 73 MG/DL
TSH SERPL-ACNC: 1.64 UIU/ML
UROBILINOGEN URINE: 0.2 MG/DL
WBC # FLD AUTO: 7.04 K/UL

## 2024-12-17 ENCOUNTER — APPOINTMENT (OUTPATIENT)
Dept: DERMATOLOGY | Facility: CLINIC | Age: 39
End: 2024-12-17